# Patient Record
Sex: MALE | Race: WHITE | NOT HISPANIC OR LATINO | Employment: UNEMPLOYED | URBAN - METROPOLITAN AREA
[De-identification: names, ages, dates, MRNs, and addresses within clinical notes are randomized per-mention and may not be internally consistent; named-entity substitution may affect disease eponyms.]

---

## 2023-10-17 PROBLEM — L82.1 OTHER SEBORRHEIC KERATOSIS: Status: ACTIVE | Noted: 2022-03-04

## 2023-10-17 PROBLEM — L72.0 EPIDERMAL CYST: Status: ACTIVE | Noted: 2022-03-04

## 2023-10-17 PROBLEM — D22.71 MELANOCYTIC NEVI OF RIGHT LOWER LIMB, INCLUDING HIP: Status: ACTIVE | Noted: 2022-03-04

## 2023-10-17 PROBLEM — L82.0 INFLAMED SEBORRHEIC KERATOSIS: Status: ACTIVE | Noted: 2022-03-04

## 2023-10-17 PROBLEM — D22.72 MELANOCYTIC NEVI OF LEFT LOWER LIMB, INCLUDING HIP: Status: ACTIVE | Noted: 2022-03-04

## 2023-10-17 PROBLEM — L82.1 SEBORRHEIC KERATOSIS: Status: ACTIVE | Noted: 2022-03-04

## 2023-10-17 PROBLEM — L72.3 SEBACEOUS CYST: Status: ACTIVE | Noted: 2022-03-04

## 2023-10-17 PROBLEM — L90.5 SCAR CONDITION AND FIBROSIS OF SKIN: Status: ACTIVE | Noted: 2022-03-04

## 2023-10-17 RX ORDER — MUPIROCIN 20 MG/G
1 OINTMENT TOPICAL
COMMUNITY
Start: 2018-07-27

## 2023-10-17 RX ORDER — CEFUROXIME AXETIL 250 MG/1
250 TABLET ORAL
COMMUNITY
Start: 2016-09-06

## 2023-10-19 ENCOUNTER — OFFICE VISIT (OUTPATIENT)
Dept: DERMATOLOGY | Facility: CLINIC | Age: 78
End: 2023-10-19
Payer: MEDICARE

## 2023-10-19 DIAGNOSIS — L82.1 SEBORRHEIC KERATOSIS: ICD-10-CM

## 2023-10-19 PROCEDURE — 11401 EXC TR-EXT B9+MARG 0.6-1 CM: CPT | Performed by: SPECIALIST

## 2023-10-19 PROCEDURE — 88305 TISSUE EXAM BY PATHOLOGIST: CPT | Mod: TC,DER | Performed by: SPECIALIST

## 2023-10-19 PROCEDURE — 11402 EXC TR-EXT B9+MARG 1.1-2 CM: CPT | Performed by: SPECIALIST

## 2023-10-19 PROCEDURE — 88305 TISSUE EXAM BY PATHOLOGIST: CPT | Performed by: DERMATOLOGY

## 2023-10-19 NOTE — PROGRESS NOTES
Subjective     Juan Magallanes is a 78 y.o. male who presents for the following: Seborrheic Keratosis (On Back - Widespread).     Review of Systems:  No other skin or systemic complaints other than what is documented elsewhere in the note.    The following portions of the chart were reviewed this encounter and updated as appropriate:          Skin Cancer History  No skin cancer on file.      Specialty Problems          Dermatology Problems    Epidermal cyst    Inflamed seborrheic keratosis    Melanocytic nevi of left lower limb, including hip    Melanocytic nevi of right lower limb, including hip    Other seborrheic keratosis    Scar condition and fibrosis of skin    Sebaceous cyst    Seborrheic keratosis        Objective   Well appearing patient in no apparent distress; mood and affect are within normal limits.    A focused skin examination was performed. All findings within normal limits unless otherwise noted below.    Assessment/Plan   1. Seborrheic keratosis (2)  Left Upper Back; Right Upper Back    Shave removal - Left Upper Back, Right Upper Back    Timeout: patient name, date of birth, surgical site, and procedure verified    Instrument used: #15 blade      Specimen 2 - Dermatopathology- DERM LAB  Differential Diagnosis: ISK vs SCC   Check Margins Yes/No?:    Comments:    Dermpath Lab: Routine Histopathology (formalin-fixed tissue)

## 2023-10-19 NOTE — PROGRESS NOTES
Subjective   HPI: Juan Magallanes is a 78 y.o. male is here for evaluation and treatment of evaluation of a blackish lesion involving his back..     ROS: No other skin or systemic complaints other than what is documented elsewhere in the note.      ALLERGIES: Patient has no known allergies.    SOCIAL:  has no history on file for tobacco use, alcohol use, and drug use.    Objective     Description: Patient has 2 inflamed keratotic plaques 1 which is flesh-colored the other which is black.  Clinically are not photodamaged and could be consistent with irritated seborrheic keratosis or squamous cell carcinomas.    Assessment/Plan   1. Seborrheic keratosis (2)  Left Upper Back; Right Upper Back    Shave removal - Left Upper Back, Right Upper Back    Specimen 1 - Dermatopathology- DERM LAB  Differential Diagnosis: ISK vs SCC   Check Margins Yes/No?:    Comments:    Dermpath Lab: Routine Histopathology (formalin-fixed tissue)    Specimen 2 - Dermatopathology- DERM LAB  Differential Diagnosis: ISK vs SCC   Check Margins Yes/No?:    Comments:    Dermpath Lab: Routine Histopathology (formalin-fixed tissue)         FOLLOW UP: Patient will be called with biopsy results.    The patient was encouraged to contact me with any further questions or concerns.  Nomi Hendricks MD  10/19/2023

## 2023-10-23 LAB
LABORATORY COMMENT REPORT: NORMAL
PATH REPORT.FINAL DX SPEC: NORMAL
PATH REPORT.GROSS SPEC: NORMAL
PATH REPORT.MICROSCOPIC SPEC OTHER STN: NORMAL
PATH REPORT.RELEVANT HX SPEC: NORMAL
PATH REPORT.TOTAL CANCER: NORMAL